# Patient Record
Sex: FEMALE | Race: WHITE | Employment: FULL TIME | URBAN - METROPOLITAN AREA
[De-identification: names, ages, dates, MRNs, and addresses within clinical notes are randomized per-mention and may not be internally consistent; named-entity substitution may affect disease eponyms.]

---

## 2020-07-31 ENCOUNTER — HOSPITAL ENCOUNTER (OUTPATIENT)
Age: 30
Discharge: HOME | End: 2020-08-02
Payer: COMMERCIAL

## 2020-07-31 ENCOUNTER — OFFICE VISIT (OUTPATIENT)
Dept: PRIMARY CARE CLINIC | Age: 30
End: 2020-07-31
Payer: COMMERCIAL

## 2020-07-31 VITALS
OXYGEN SATURATION: 98 % | TEMPERATURE: 98.1 F | WEIGHT: 210 LBS | DIASTOLIC BLOOD PRESSURE: 74 MMHG | HEART RATE: 100 BPM | SYSTOLIC BLOOD PRESSURE: 138 MMHG | BODY MASS INDEX: 32.96 KG/M2 | HEIGHT: 67 IN

## 2020-07-31 PROCEDURE — U0003 INFECTIOUS AGENT DETECTION BY NUCLEIC ACID (DNA OR RNA); SEVERE ACUTE RESPIRATORY SYNDROME CORONAVIRUS 2 (SARS-COV-2) (CORONAVIRUS DISEASE [COVID-19]), AMPLIFIED PROBE TECHNIQUE, MAKING USE OF HIGH THROUGHPUT TECHNOLOGIES AS DESCRIBED BY CMS-2020-01-R: HCPCS

## 2020-07-31 PROCEDURE — 99213 OFFICE O/P EST LOW 20 MIN: CPT | Performed by: FAMILY MEDICINE

## 2020-07-31 RX ORDER — CETIRIZINE HYDROCHLORIDE 10 MG/1
10 TABLET ORAL DAILY
COMMUNITY

## 2020-07-31 RX ORDER — BUDESONIDE AND FORMOTEROL FUMARATE DIHYDRATE 80; 4.5 UG/1; UG/1
2 AEROSOL RESPIRATORY (INHALATION) 2 TIMES DAILY
Qty: 1 INHALER | Refills: 0 | Status: SHIPPED
Start: 2020-07-31 | End: 2021-12-29

## 2020-07-31 RX ORDER — ALBUTEROL SULFATE 90 UG/1
1-2 AEROSOL, METERED RESPIRATORY (INHALATION) EVERY 4 HOURS PRN
Qty: 1 INHALER | Refills: 0 | Status: SHIPPED
Start: 2020-07-31 | End: 2021-12-29

## 2020-07-31 NOTE — LETTER
[place] [address]  [place], [address]  Phone: [phone]  Fax: [phone]    Brian Schlatter, MD      7/31/2020     Patient: Chance Blanco   YOB: 1990       To Whom It May Concern: It is my medical opinion that Chance Blanco should remain out of work/school while acutely ill and awaiting COVID-19 test results. Return to work with no retesting should be followed if test is negative AND meets these 3 criteria as outlined by CDC/ODH:   a. No fever without the use of fever reducers for 24 hours  b. Improvement in symptoms  c. At least 7 days since the onset of symptoms     If tests positive for COVID-19, needs 10 days self-quarantine and may return to work once meeting above 3 criteria. If you have any questions or concerns, please don't hesitate to call.     Sincerely,        Brian Schlatter, MD, [facility] [phone]

## 2020-07-31 NOTE — PROGRESS NOTES
Jorge Luis Ludwig  1990    Chief Complaint   Patient presents with    Cough     x 3 days    Headache     x 3 days    Fatigue     x 3 days       Respiratory Symptoms:  Patient complains of 3 day(s) history of headache, nasal congestion, sneezing, shortness of breath and non-productive cough. Symptoms have been unchanged with time. She denies any other symptoms . Relevant PMH: No pertinent PMH. Smoking history:  She  reports that she quit smoking about 8 years ago. Her smoking use included cigarettes. She has a 4.50 pack-year smoking history. She has never used smokeless tobacco.     She has had ill contacts with URI symptoms. Treatment to date: Deborah Presley. Travel screen completed:  Yes          Vitals:    07/31/20 1410   BP: 138/74   Pulse: 100   Temp: 98.1 °F (36.7 °C)   TempSrc: Oral   SpO2: 98%   Weight: 210 lb (95.3 kg)   Height: 5' 7\" (1.702 m)      Physical Exam  Vitals signs and nursing note reviewed. Constitutional:       General: She is not in acute distress. Appearance: She is well-developed. She is not ill-appearing or diaphoretic. HENT:      Head: Normocephalic and atraumatic. Right Ear: Tympanic membrane, ear canal and external ear normal.      Left Ear: Tympanic membrane, ear canal and external ear normal.      Nose: Mucosal edema present. No congestion or rhinorrhea. Right Sinus: No maxillary sinus tenderness or frontal sinus tenderness. Left Sinus: No maxillary sinus tenderness or frontal sinus tenderness. Mouth/Throat:      Mouth: Mucous membranes are moist.      Pharynx: Oropharynx is clear. Uvula midline. No oropharyngeal exudate or posterior oropharyngeal erythema. Tonsils: No tonsillar abscesses. Eyes:      General: No scleral icterus. Conjunctiva/sclera: Conjunctivae normal.   Neck:      Musculoskeletal: Neck supple. Cardiovascular:      Rate and Rhythm: Normal rate and regular rhythm. Heart sounds: Normal heart sounds. Pulmonary:      Effort: Pulmonary effort is normal. No accessory muscle usage or respiratory distress. Breath sounds: Normal breath sounds. No stridor. No decreased breath sounds, wheezing, rhonchi or rales. Lymphadenopathy:      Cervical: No cervical adenopathy. Skin:     General: Skin is warm and dry. Findings: No rash. Neurological:      Mental Status: She is alert. Assessment/Plan:  Keaton Mota was seen today for cough, headache and fatigue. Diagnoses and all orders for this visit:    Suspected COVID-19 virus infection  -     Covid-19 Ambulatory; Future  -     albuterol sulfate HFA (VENTOLIN HFA) 108 (90 Base) MCG/ACT inhaler; Inhale 1-2 puffs into the lungs every 4 hours as needed for Wheezing or Shortness of Breath OK substitute  -     budesonide-formoterol (SYMBICORT) 80-4.5 MCG/ACT AERO; Inhale 2 puffs into the lungs 2 times daily Rinse mouth after use. Tylenol PRN  COVID test sent today  Can add Vit C and low dose Zn OTC for 3-5 days  Further meds pending results and symptom progression  Advised RTW criteria    Advised of current Covid-19 pandemic and it is possible that this could be a covid infection and as such certain precautions/isolation should be followed. Gave CDC info on both flu and Covid-19 precautions including remaining at home while sick and avoiding people who could possibly develop severe infection if exposed. Aníbal Dubon MD  7/31/20     This visit was provided as a focused evaluation during the COVID -19 pandemic/national emergency. A comprehensive review of all previous patient history and testing was not conducted. Pertinent findings were elicited during the visit.

## 2020-08-03 LAB
SARS-COV-2: NOT DETECTED
SOURCE: NORMAL

## 2021-12-29 ENCOUNTER — OFFICE VISIT (OUTPATIENT)
Dept: PRIMARY CARE CLINIC | Age: 31
End: 2021-12-29
Payer: COMMERCIAL

## 2021-12-29 VITALS
DIASTOLIC BLOOD PRESSURE: 60 MMHG | SYSTOLIC BLOOD PRESSURE: 110 MMHG | OXYGEN SATURATION: 97 % | WEIGHT: 199 LBS | TEMPERATURE: 97.1 F | HEART RATE: 92 BPM | BODY MASS INDEX: 31.17 KG/M2

## 2021-12-29 DIAGNOSIS — J02.9 PHARYNGITIS, UNSPECIFIED ETIOLOGY: ICD-10-CM

## 2021-12-29 DIAGNOSIS — R52 BODY ACHES: ICD-10-CM

## 2021-12-29 DIAGNOSIS — R68.83 CHILLS: ICD-10-CM

## 2021-12-29 DIAGNOSIS — R53.83 FATIGUE, UNSPECIFIED TYPE: ICD-10-CM

## 2021-12-29 DIAGNOSIS — J34.89 SINUS PRESSURE: ICD-10-CM

## 2021-12-29 DIAGNOSIS — R09.81 NASAL CONGESTION: ICD-10-CM

## 2021-12-29 DIAGNOSIS — J06.9 UPPER RESPIRATORY TRACT INFECTION, UNSPECIFIED TYPE: Primary | ICD-10-CM

## 2021-12-29 LAB
BASOPHILS ABSOLUTE: 0.02 E9/L (ref 0–0.2)
BASOPHILS RELATIVE PERCENT: 0.4 % (ref 0–2)
EOSINOPHILS ABSOLUTE: 0.05 E9/L (ref 0.05–0.5)
EOSINOPHILS RELATIVE PERCENT: 1.1 % (ref 0–6)
HCT VFR BLD CALC: 42.1 % (ref 34–48)
HEMOGLOBIN: 14.4 G/DL (ref 11.5–15.5)
HETEROPHILE ANTIBODIES: NORMAL
IMMATURE GRANULOCYTES #: 0.01 E9/L
IMMATURE GRANULOCYTES %: 0.2 % (ref 0–5)
INFLUENZA A ANTIGEN, POC: NORMAL
INFLUENZA B ANTIGEN, POC: NORMAL
LYMPHOCYTES ABSOLUTE: 0.89 E9/L (ref 1.5–4)
LYMPHOCYTES RELATIVE PERCENT: 19.7 % (ref 20–42)
Lab: NORMAL
MCH RBC QN AUTO: 30.6 PG (ref 26–35)
MCHC RBC AUTO-ENTMCNC: 34.2 % (ref 32–34.5)
MCV RBC AUTO: 89.4 FL (ref 80–99.9)
MONOCYTES ABSOLUTE: 0.61 E9/L (ref 0.1–0.95)
MONOCYTES RELATIVE PERCENT: 13.5 % (ref 2–12)
NEUTROPHILS ABSOLUTE: 2.93 E9/L (ref 1.8–7.3)
NEUTROPHILS RELATIVE PERCENT: 65.1 % (ref 43–80)
PDW BLD-RTO: 13.2 FL (ref 11.5–15)
PLATELET # BLD: 218 E9/L (ref 130–450)
PMV BLD AUTO: 9.5 FL (ref 7–12)
QC PASS/FAIL: NORMAL
RBC # BLD: 4.71 E12/L (ref 3.5–5.5)
SARS-COV-2 RDRP RESP QL NAA+PROBE: NEGATIVE
WBC # BLD: 4.5 E9/L (ref 4.5–11.5)

## 2021-12-29 PROCEDURE — 86308 HETEROPHILE ANTIBODY SCREEN: CPT | Performed by: NURSE PRACTITIONER

## 2021-12-29 PROCEDURE — 99213 OFFICE O/P EST LOW 20 MIN: CPT | Performed by: NURSE PRACTITIONER

## 2021-12-29 PROCEDURE — 87804 INFLUENZA ASSAY W/OPTIC: CPT | Performed by: NURSE PRACTITIONER

## 2021-12-29 PROCEDURE — 87635 SARS-COV-2 COVID-19 AMP PRB: CPT | Performed by: NURSE PRACTITIONER

## 2021-12-29 RX ORDER — FLUTICASONE PROPIONATE 50 MCG
2 SPRAY, SUSPENSION (ML) NASAL DAILY
Qty: 16 G | Refills: 0 | Status: SHIPPED | OUTPATIENT
Start: 2021-12-29

## 2021-12-29 RX ORDER — DOXYCYCLINE HYCLATE 100 MG
100 TABLET ORAL 2 TIMES DAILY
Qty: 20 TABLET | Refills: 0 | Status: SHIPPED | OUTPATIENT
Start: 2021-12-29 | End: 2022-01-08

## 2021-12-29 NOTE — LETTER
[address] [facility] [address]  Phone: [phone]  Fax: [phone]    ZAIN Mota NP        December 29, 2021     Patient: Charlynne Sandifer   YOB: 1990   Date of Visit: 12/29/2021       To Whom It May Concern: It is my medical opinion that Charlynne Sandifer may return to work on 12/31/2021. If you have any questions or concerns, please don't hesitate to call.     Sincerely,        ZAIN Mota NP

## 2021-12-29 NOTE — PROGRESS NOTES
Chief Complaint   Concern For COVID-19 (ha, fever, chills, body aches, diarrhea, sinus pressure and pain, x 1 day  )      History of Present Illness   Source of history provided by:  patient. Joanie Chavarria is a 27 y.o. old female who presents to the walk in clinic with complaints of Fever, Chills, Generalized Body Aches, Headache, Pharyngitis, Rhinorrhea, Diarrhea , Fatigue and Patient was exposed to someone who is a known Covid-19 infected person or directly caring for such person x 1 days. States symptoms have worsened, since onset. Has been taking Acetaminophen, NSAID, without symptomatic relief. Denies any Cough, Shortness of breath, Nausea, Vomiting, Chest Pain, LE Edema, Abdominal Pain or Rash. Denies any hx of asthma, COPD, emphysema or pneumonia. ROS   Pertinent positives and negatives are stated within HPI, all other systems reviewed and are negative. Past Medical History:  has a past medical history of Abnormal Pap smear, Allergy, Anxiety, Cervical dysplasia, Depressive disorder, not elsewhere classified, Drug effect, and Herpes simplex without mention of complication. Past Surgical History:  has a past surgical history that includes Appendectomy; LEEP; laparoscopy; tilt table report (12/17/2014); Dilation & curettage; and shoulder surgery. Social History:  reports that she quit smoking about 9 years ago. Her smoking use included cigarettes. She has a 4.50 pack-year smoking history. She has never used smokeless tobacco. She reports that she does not drink alcohol and does not use drugs. Family History: family history includes Diabetes in her mother; Hypertension in her father and mother. Allergies: Nickel    Physical Exam   Vital Signs:  /60   Pulse 92   Temp 97.1 °F (36.2 °C) (Temporal)   Wt 199 lb (90.3 kg)   SpO2 97%   BMI 31.17 kg/m²    Oxygen Saturation Interpretation: Normal.    Constitutional:  Alert, development consistent with age. NAD.   Moderate amount of swelling noted to bilateral nares. Head:  NC/NT. Airway patent. Ears: TMs clear bilaterally. Canals without exudate or swelling bilaterally. Mouth: Posterior pharynx with mild erythema and clear postnasal drip. No tonsillar hypertrophy or exudate. Neck:  Normal ROM. Supple. No anterior cervical adenopathy noted. Lungs: CTAB without wheezes, rales, or rhonchi. CV:  Regular rate and rhythm, normal heart sounds, without pathological murmurs, ectopy, gallops, or rubs. Skin:  Normal turgor. Warm, dry, without visible rash. Lymphatic: No lymphangitis or adenopathy noted. Neurological:  Oriented. Motor functions intact. Lab / Imaging Results   (All laboratory and radiology results have been personally reviewed by myself)  Labs:  Results for orders placed or performed in visit on 12/29/21   POCT COVID-19, Rapid   Result Value Ref Range    SARS-COV-2, RdRp gene Negative Negative    Lot Number [id number]     QC Pass/Fail pass    POCT Infectious mononucleosis Abs (mono)   Result Value Ref Range    Monospot NEG    POCT Influenza A/B Antigen (BD Veritor)   Result Value Ref Range    Inflenza A Ag neg     Influenza B Ag neg        Imaging: All Radiology results interpreted by Radiologist unless otherwise noted. No results found. Medical Decision Making   Pt non-toxic, in no apparent distress and stable at time of discharge. Assessment/Plan   Bere Kamara was seen today for concern for covid-19. Diagnoses and all orders for this visit:    Upper respiratory tract infection, unspecified type  -     doxycycline hyclate (VIBRA-TABS) 100 MG tablet;  Take 1 tablet by mouth 2 times daily for 10 days  -     POCT Influenza A/B Antigen (BD Veritor)    Nasal congestion  -     fluticasone (FLONASE) 50 MCG/ACT nasal spray; 2 sprays by Each Nostril route daily  -     POCT Influenza A/B Antigen (BD Veritor)    Pharyngitis, unspecified etiology  -     POCT COVID-19, Rapid  -     POCT Infectious mononucleosis Abs (mono)  - POCT Influenza A/B Antigen (BD Veritor)    Sinus pressure  -     POCT COVID-19, Rapid  -     fluticasone (FLONASE) 50 MCG/ACT nasal spray; 2 sprays by Each Nostril route daily  -     POCT Influenza A/B Antigen (BD Veritor)    Chills  -     POCT COVID-19, Rapid  -     POCT Influenza A/B Antigen (BD Veritor)    Body aches  -     POCT COVID-19, Rapid  -     POCT Influenza A/B Antigen (BD Veritor)    Fatigue, unspecified type  -     CBC Auto Differential; Future  -     POCT Infectious mononucleosis Abs (mono)  -     POCT Influenza A/B Antigen (BD Veritor)        COVID-19, Flu A/B, and mono swab obtained and noted to be negative. Increase fluids and rest. Symptomatic relief discussed including Tylenol prn pain/fever. Schedule f/u with PCP in 7-10 days if symptoms persist. ED sooner if symptoms worsen or change. ED immediately with high or refractory fever, progressive SOB, dyspnea, CP, calf pain/swelling, shaking chills, vomiting, abdominal pain, lethargy, flank pain, or decreased urinary output. Pt verbalizes understanding and is in agreement with plan of care. All questions answered. ZAIN Rodriguez - NP    This visit was provided as a focused evaluation during the [employer] -19 pandemic/national emergency. A comprehensive review of all previous patient history and testing was not conducted. Pertinent findings were elicited during the visit. *NOTE: This report was transcribed using voice recognition software. Every effort was made to ensure accuracy; however, inadvertent computerized transcription errors may be present.

## 2021-12-30 ENCOUNTER — TELEPHONE (OUTPATIENT)
Dept: PRIMARY CARE CLINIC | Age: 31
End: 2021-12-30

## 2021-12-30 NOTE — TELEPHONE ENCOUNTER
PT SAW YOU IN WALK IN YESTERDAY AND WANTS TO KNOW IF SHE HAS A VIRUS AND IF SHE IS CONTAGIOUS. NEEDS TO KNOW IF SHE CAN RETURN TO WORK TOMORROW.

## 2022-01-03 NOTE — TELEPHONE ENCOUNTER
I believe the Mucinex is going to be the best to help get the mucus out of her lungs. She should continue with that and make sure that she is drinking plenty of fluids.

## 2022-01-03 NOTE — TELEPHONE ENCOUNTER
Pt notified. Verbalized understanding. Patient is currently taking Mucinex and doesn't seem to be working. Would like to know if there is something stronger that would be better?